# Patient Record
Sex: MALE | Race: WHITE | NOT HISPANIC OR LATINO | URBAN - METROPOLITAN AREA
[De-identification: names, ages, dates, MRNs, and addresses within clinical notes are randomized per-mention and may not be internally consistent; named-entity substitution may affect disease eponyms.]

---

## 2020-10-29 ENCOUNTER — OFFICE VISIT (OUTPATIENT)
Dept: URBAN - METROPOLITAN AREA CLINIC 40 | Facility: CLINIC | Age: 50
End: 2020-10-29
Payer: COMMERCIAL

## 2020-10-29 DIAGNOSIS — Z12.11 ENCOUNTER FOR SCREENING COLONOSCOPY: ICD-10-CM

## 2020-10-29 DIAGNOSIS — K21.9 GERD: ICD-10-CM

## 2020-10-29 PROCEDURE — G9902 PT SCRN TBCO AND ID AS USER: HCPCS | Performed by: PHYSICIAN ASSISTANT

## 2020-10-29 PROCEDURE — G9906 PT RECV TBCO CESS INTERV: HCPCS | Performed by: PHYSICIAN ASSISTANT

## 2020-10-29 PROCEDURE — 99203 OFFICE O/P NEW LOW 30 MIN: CPT | Performed by: PHYSICIAN ASSISTANT

## 2020-10-29 PROCEDURE — G8427 DOCREV CUR MEDS BY ELIG CLIN: HCPCS | Performed by: PHYSICIAN ASSISTANT

## 2020-10-29 NOTE — HPI-TODAY'S VISIT:
Mr. Vargas is a 49-year-old white male who presents to the office today to schedule a colorectal cancer screening.  No prior colonoscopy.  Denies a family history of colorectal cancer.  His mother did have lung cancer.  He is a current smoker.  Occasional alcohol.  Long history of GERD on Nexium daily for many years.  He has been unable to wean PPI therapy.  Believes he had an EGD years ago, greater than 5 to 10 years ago with no reported findings.  Has a good appetite.  Denies trouble swallowing.  No changes in bowel habits or rectal bleeding reported.

## 2020-11-10 ENCOUNTER — TELEPHONE ENCOUNTER (OUTPATIENT)
Dept: URBAN - METROPOLITAN AREA CLINIC 40 | Facility: CLINIC | Age: 50
End: 2020-11-10

## 2020-11-25 ENCOUNTER — OFFICE VISIT (OUTPATIENT)
Dept: URBAN - METROPOLITAN AREA SURGERY CENTER 30 | Facility: SURGERY CENTER | Age: 50
End: 2020-11-25
Payer: COMMERCIAL

## 2020-11-25 DIAGNOSIS — K63.5 BENIGN COLON POLYP: ICD-10-CM

## 2020-11-25 DIAGNOSIS — Z12.11 COLON CANCER SCREENING: ICD-10-CM

## 2020-11-25 PROCEDURE — G9935 CANC NOT DETECTD DURING SRCN: HCPCS | Performed by: INTERNAL MEDICINE

## 2020-11-25 PROCEDURE — G8907 PT DOC NO EVENTS ON DISCHARG: HCPCS | Performed by: INTERNAL MEDICINE

## 2020-11-25 PROCEDURE — 45380 COLONOSCOPY AND BIOPSY: CPT | Performed by: INTERNAL MEDICINE

## 2020-12-07 ENCOUNTER — DASHBOARD ENCOUNTERS (OUTPATIENT)
Age: 50
End: 2020-12-07

## 2020-12-08 ENCOUNTER — OFFICE VISIT (OUTPATIENT)
Dept: URBAN - METROPOLITAN AREA CLINIC 40 | Facility: CLINIC | Age: 50
End: 2020-12-08
Payer: COMMERCIAL

## 2020-12-08 DIAGNOSIS — K63.5 BENIGN COLON POLYPS: ICD-10-CM

## 2020-12-08 DIAGNOSIS — K21.9 GERD: ICD-10-CM

## 2020-12-08 DIAGNOSIS — K64.8 INTERNAL HEMORRHOIDS: ICD-10-CM

## 2020-12-08 PROCEDURE — G8427 DOCREV CUR MEDS BY ELIG CLIN: HCPCS | Performed by: INTERNAL MEDICINE

## 2020-12-08 PROCEDURE — 99213 OFFICE O/P EST LOW 20 MIN: CPT | Performed by: INTERNAL MEDICINE

## 2020-12-08 PROCEDURE — 3017F COLORECTAL CA SCREEN DOC REV: CPT | Performed by: INTERNAL MEDICINE

## 2020-12-08 NOTE — HPI-TODAY'S VISIT:
Mr. Vargas is a 50-year-old white male who returns to the office after recent colorectal cancer screening.  No prior colonoscopy.  Denies a family history of colorectal cancer.  His mother did have lung cancer.  He is a current smoker.  Occasional alcohol.  Long history of GERD on Nexium daily for many years.  He has been unable to wean PPI therapy.  Believes he had an EGD years ago, greater than 5 to 10 years ago with no reported findings.  Has a good appetite.  Denies trouble swallowing.  No changes in bowel habits or rectal bleeding reported. Two benign polyps with lymphoid aggregates removed from transverse colon. Nonbleeding internal hemorrhoids and perianal skin tags on exam. Did not complete EGD w/ bx for Hart's screening due to high cost of procedure. Unable to wean to qod dosing of Nexium. Good appetite. Weight stable. No new GI complaints.

## 2020-12-13 PROBLEM — 235595009 GASTROESOPHAGEAL REFLUX DISEASE: Status: ACTIVE | Noted: 2020-10-29
